# Patient Record
Sex: FEMALE | Race: WHITE | NOT HISPANIC OR LATINO | Employment: FULL TIME | URBAN - METROPOLITAN AREA
[De-identification: names, ages, dates, MRNs, and addresses within clinical notes are randomized per-mention and may not be internally consistent; named-entity substitution may affect disease eponyms.]

---

## 2018-08-17 ENCOUNTER — HOSPITAL ENCOUNTER (EMERGENCY)
Facility: HOSPITAL | Age: 33
Discharge: HOME | End: 2018-08-17
Attending: SURGERY
Payer: MEDICAID

## 2018-08-17 VITALS
HEART RATE: 68 BPM | BODY MASS INDEX: 41.77 KG/M2 | DIASTOLIC BLOOD PRESSURE: 69 MMHG | TEMPERATURE: 98 F | OXYGEN SATURATION: 97 % | RESPIRATION RATE: 16 BRPM | SYSTOLIC BLOOD PRESSURE: 120 MMHG | WEIGHT: 251 LBS

## 2018-08-17 DIAGNOSIS — M54.50 CHRONIC BILATERAL LOW BACK PAIN WITHOUT SCIATICA: Primary | ICD-10-CM

## 2018-08-17 DIAGNOSIS — G89.29 CHRONIC BILATERAL LOW BACK PAIN WITHOUT SCIATICA: Primary | ICD-10-CM

## 2018-08-17 PROCEDURE — 96372 THER/PROPH/DIAG INJ SC/IM: CPT

## 2018-08-17 PROCEDURE — 63600175 PHARM REV CODE 636 W HCPCS: Performed by: SURGERY

## 2018-08-17 PROCEDURE — 99283 EMERGENCY DEPT VISIT LOW MDM: CPT | Mod: 25

## 2018-08-17 RX ORDER — ONDANSETRON 2 MG/ML
4 INJECTION INTRAMUSCULAR; INTRAVENOUS
Status: COMPLETED | OUTPATIENT
Start: 2018-08-17 | End: 2018-08-17

## 2018-08-17 RX ORDER — KETOROLAC TROMETHAMINE 10 MG/1
10 TABLET, FILM COATED ORAL EVERY 6 HOURS PRN
Qty: 15 TABLET | Refills: 0 | Status: SHIPPED | OUTPATIENT
Start: 2018-08-17 | End: 2020-04-12

## 2018-08-17 RX ORDER — MEPERIDINE HYDROCHLORIDE 25 MG/ML
25 INJECTION INTRAMUSCULAR; INTRAVENOUS; SUBCUTANEOUS
Status: COMPLETED | OUTPATIENT
Start: 2018-08-17 | End: 2018-08-17

## 2018-08-17 RX ORDER — METFORMIN HYDROCHLORIDE 500 MG/1
500 TABLET ORAL 2 TIMES DAILY WITH MEALS
COMMUNITY
End: 2024-01-25

## 2018-08-17 RX ORDER — CYCLOBENZAPRINE HCL 10 MG
10 TABLET ORAL 3 TIMES DAILY PRN
Qty: 10 TABLET | Refills: 0 | Status: SHIPPED | OUTPATIENT
Start: 2018-08-17 | End: 2018-08-22

## 2018-08-17 RX ADMIN — ONDANSETRON 4 MG: 2 INJECTION INTRAMUSCULAR; INTRAVENOUS at 10:08

## 2018-08-17 RX ADMIN — MEPERIDINE HYDROCHLORIDE 25 MG: 25 INJECTION INTRAMUSCULAR; INTRAVENOUS; SUBCUTANEOUS at 10:08

## 2018-08-18 NOTE — ED PROVIDER NOTES
[facility]                                                 Chief Complaint  32 y.o. female with Back Pain    History of Present Illness  Rachael Guerra presents to the emergency room with low back pain this week  Patient works at the local [employer], heavy lifting every day at work  Patient with left lower paraspinous pain on interview, denies any acute trauma   Patient on exam has a normal lumbar spine without step-off or deformity noted  Patient has no signs of cauda equina syndrome with a normal neuro exam now  Back pain in the past, any movement exacerbates this recent lower lumbar pain    The history is provided by the patient   device was not used during this ER visit  Medical history: Fever blister, PCOS, psoriasis  Surgeries: , elbow surgery, ovarian cyst, exploratory laparotomy, tubal ligation  Allergies: Percocet    Review of Systems and Physical Exam      Review of Systems  -- Constitution - no fever, denies fatigue, no weakness, no chills  -- Eyes - no tearing or redness, no visual disturbance  -- Ear, Nose - no tinnitus or earache, no nasal congestion or discharge  -- Mouth,Throat - no sore throat, no toothache, normal voice, normal swallowing  -- Respiratory - denies cough and congestion, no shortness of breath, no ALCAZAR  -- Cardiovascular - denies chest pain, no palpitations, denies claudication  -- Gastrointestinal - denies abdominal pain, nausea, vomiting, or diarrhea  -- Genitourinary - no dysuria, denies flank pain, no hematuria, no STD risk  -- Musculoskeletal - back pain, negative for myalgias and arthralgias   -- Neurological - no headache, denies weakness or seizure; no LOC  -- Skin - denies pallor, rash, or changes in skin. no hives or welts noted  -- Psychiatric - Denies SI or HI, no psychosis or fractured thought noted     /71   Pulse 97   Temp 97.7 °F (36.5 °C) (Oral)   Resp 16     Physical Exam  -- Nursing note and  vitals reviewed  -- Constitutional: Appears well-developed and well-nourished  -- Head: Atraumatic. Normocephalic. No obvious abnormality  -- Eyes: Pupils are equal and reactive to light. Normal conjunctiva and lids  -- Cardiac: Normal rate, regular rhythm and normal heart sounds  -- Pulmonary: Normal respiratory effort, breath sounds clear to auscultation  -- Abdominal: Soft, no tenderness. Normal bowel sounds. Normal liver edge  -- Musculoskeletal: Normal range of motion, no effusions. Joints stable   -- Neurological: No focal deficits. Showed good interaction with staff  -- Vascular: Posterior tibial, dorsalis pedis and radial pulses 2+ bilaterally      Emergency Room Course      Treatment and Evaluation  -- L-spine x-rays showed no evidence of acute fracture or dislocation   -- IM 25 milligrams Demerol given in the ER  -- IM 4 mg Zofran given today in the ER     Diagnosis  -- The encounter diagnosis was Chronic bilateral low back pain without sciatica.    Disposition and Plan  -- Disposition: home  -- Condition: stable  -- Follow-up: Patient to follow up with Summer Jaime MD in 1-2 days.  -- I advised the patient that we have found no life threatening condition today  -- At this time, I believe the patient is clinically stable for discharge.   -- The patient acknowledges that close follow up with a MD is required   -- Patient agrees to comply with all instruction and direction given in the ER    This note is dictated on Dragon Natural Speaking word recognition program.  There are word recognition mistakes that are occasionally missed on review.         Jas Bahena MD  08/17/18 [id number]

## 2018-09-06 ENCOUNTER — TELEPHONE (OUTPATIENT)
Dept: PHARMACY | Facility: CLINIC | Age: 33
End: 2018-09-06

## 2018-09-06 NOTE — TELEPHONE ENCOUNTER
Initial Humira 40mg/0.8ml Pen Injection consult completed on . Humira 40mg/0.8ml Pen Injection will be shipped on 9/10 to arrive at patient's home on  via [employer]. $3 copay. Patient will start Humira 40mg/0.8ml Pen Injection on . Address confirmed, CC on file. Confirmed 2 patient identifiers - name and . Therapy Appropriate.    Counseled patient on administration directions:  -Inject Humira 40mg (1pen) into the skin every 14 days.- Take out of the refrigerator 30-60 minutes prior to injection.  - Wash hands before and after injection.  - Monthly RX will come with gauze, bandaids, and alcohol swabs.  - Patient may inject in either the tops of the thighs, abdomen- but at least 2 inches away from her belly button, or the outer part of her upper arm.  Patient was instructed to rotate injections sites.  - Patient is to wipe down the injection site with the alcohol pad, wait to dry.  Gently squeeze the area of the cleaned skin and hold it firmly.   Place the pen flat against the raised area of skin that is being squeezed, then push down on the button and hold for 10-15 seconds, until the window has gone from clear to yellow.  - Patient should rotate injection sites.   - Patient will use sharps container; once full, per JOSE ELIAS odonnell, she may lock the sharps container and place in her trash. She can then contact the Pharmacy and we will replace the sharps at no additional charge.    Patient was counseled on possible side effects:  - Injection site reaction: redness, soreness, itching, bruising, which should resolve within 3-5 days.   - Increased risk for infections.  If patient becomes sick, patient is to hold Humira  use until she/ he is better.     DDIs:  Medication list reviewed, no DDI    Patient verbalized understanding. Compliance stressed. Patient advised to keep a calendar marking dates of injections to ensure better compliance. Patient advised to call myself or provider should any questions arise. Patient  plans to start Humira on 9/14. Consultation included: indication; goals of treatment; administration; storage and handling; side effects; how to handle side effects; the importance of compliance; how to handle missed doses; the importance of laboratory monitoring; the importance of keeping all follow up appointments.  Patient understands to report any medication changes to [facility] and provider. All questions answered and addressed to patients satisfaction. I will f/u with her in 1 week from start, [facility] to contact patient in 3 weeks for refills.

## 2018-10-01 ENCOUNTER — TELEPHONE (OUTPATIENT)
Dept: PHARMACY | Facility: CLINIC | Age: 33
End: 2018-10-01

## 2018-10-25 ENCOUNTER — TELEPHONE (OUTPATIENT)
Dept: PHARMACY | Facility: CLINIC | Age: 33
End: 2018-10-25

## 2018-11-06 ENCOUNTER — OFFICE VISIT (OUTPATIENT)
Dept: URGENT CARE | Facility: CLINIC | Age: 33
End: 2018-11-06
Payer: MEDICAID

## 2018-11-06 VITALS
HEIGHT: 65 IN | OXYGEN SATURATION: 97 % | WEIGHT: 246 LBS | TEMPERATURE: 98 F | BODY MASS INDEX: 40.98 KG/M2 | RESPIRATION RATE: 18 BRPM | HEART RATE: 107 BPM | DIASTOLIC BLOOD PRESSURE: 90 MMHG | SYSTOLIC BLOOD PRESSURE: 138 MMHG

## 2018-11-06 DIAGNOSIS — H66.001 ACUTE SUPPURATIVE OTITIS MEDIA OF RIGHT EAR WITHOUT SPONTANEOUS RUPTURE OF TYMPANIC MEMBRANE, RECURRENCE NOT SPECIFIED: Primary | ICD-10-CM

## 2018-11-06 DIAGNOSIS — J32.4 PANSINUSITIS, UNSPECIFIED CHRONICITY: ICD-10-CM

## 2018-11-06 PROCEDURE — 99204 OFFICE O/P NEW MOD 45 MIN: CPT | Mod: S$GLB,,, | Performed by: PHYSICIAN ASSISTANT

## 2018-11-06 RX ORDER — DEXAMETHASONE SODIUM PHOSPHATE 100 MG/10ML
5 INJECTION INTRAMUSCULAR; INTRAVENOUS
Status: COMPLETED | OUTPATIENT
Start: 2018-11-06 | End: 2018-11-06

## 2018-11-06 RX ORDER — AMOXICILLIN AND CLAVULANATE POTASSIUM 875; 125 MG/1; MG/1
1 TABLET, FILM COATED ORAL 2 TIMES DAILY
Qty: 14 TABLET | Refills: 0 | Status: SHIPPED | OUTPATIENT
Start: 2018-11-06 | End: 2018-11-13

## 2018-11-06 RX ORDER — BROMPHENIRAMINE MALEATE, PSEUDOEPHEDRINE HYDROCHLORIDE, AND DEXTROMETHORPHAN HYDROBROMIDE 2; 30; 10 MG/5ML; MG/5ML; MG/5ML
10 SYRUP ORAL EVERY 6 HOURS PRN
Qty: 120 ML | Refills: 0 | Status: SHIPPED | OUTPATIENT
Start: 2018-11-06 | End: 2018-11-09

## 2018-11-06 RX ORDER — PREDNISONE 10 MG/1
10 TABLET ORAL DAILY
Qty: 4 TABLET | Refills: 0 | Status: SHIPPED | OUTPATIENT
Start: 2018-11-06 | End: 2018-11-10

## 2018-11-06 RX ADMIN — DEXAMETHASONE SODIUM PHOSPHATE 5 MG: 100 INJECTION INTRAMUSCULAR; INTRAVENOUS at 02:11

## 2018-11-06 NOTE — PROGRESS NOTES
"Subjective:       Patient ID: Rachael Guerra is a 33 y.o. female.    Vitals:  height is 5' 5" (1.651 m) and weight is 111.6 kg (246 lb). Her oral temperature is 98.3 °F (36.8 °C). Her blood pressure is 138/90 (abnormal) and her pulse is 107. Her respiration is 18 and oxygen saturation is 97%.     Chief Complaint: Sinus Problem    Patient complains of a RN, congestion, PND, sore throat, cough, headache, and sinus pressure/pain for the past 3 days.  Patient denies fever and chills.  Patient also denies improvement with OTC meds.  Patient denies any other complaints at this time.         Sinus Problem   This is a new problem. The current episode started in the past 7 days. The problem has been gradually worsening since onset. There has been no fever. The pain is moderate. Associated symptoms include congestion, coughing, ear pain, headaches, sinus pressure and a sore throat. Pertinent negatives include no chills, hoarse voice or shortness of breath. Past treatments include oral decongestants. The treatment provided no relief.     Review of Systems   Constitution: Negative for chills, fever and malaise/fatigue.   HENT: Positive for congestion, ear pain, sinus pressure and sore throat. Negative for hoarse voice.    Eyes: Negative for discharge and redness.   Cardiovascular: Negative for chest pain, dyspnea on exertion and leg swelling.   Respiratory: Positive for cough. Negative for shortness of breath, sputum production and wheezing.    Musculoskeletal: Negative for myalgias.   Gastrointestinal: Negative for abdominal pain, diarrhea, nausea and vomiting.   Neurological: Positive for headaches.   All other systems reviewed and are negative.      Objective:      Physical Exam   Constitutional: She is oriented to person, place, and time. She appears well-developed and well-nourished. No distress.   HENT:   Head: Normocephalic and atraumatic.   Right Ear: External ear and ear canal normal. Tympanic membrane is " erythematous. A middle ear effusion (purulent) is present.   Left Ear: External ear and ear canal normal. Tympanic membrane is erythematous (mild). A middle ear effusion is present.   Nose: Mucosal edema and rhinorrhea present. Right sinus exhibits maxillary sinus tenderness and frontal sinus tenderness. Left sinus exhibits maxillary sinus tenderness and frontal sinus tenderness.   Mouth/Throat: Uvula is midline and mucous membranes are normal. Posterior oropharyngeal erythema present. No tonsillar exudate.   Eyes: Conjunctivae, EOM and lids are normal. Pupils are equal, round, and reactive to light.   Neck: Normal range of motion. Neck supple.   Cardiovascular: Normal rate, regular rhythm and normal heart sounds.   Pulmonary/Chest: Effort normal and breath sounds normal. No respiratory distress.   Abdominal: Soft. Normal appearance and bowel sounds are normal. She exhibits no distension and no mass. There is no tenderness.   Musculoskeletal: Normal range of motion.   Neurological: She is alert and oriented to person, place, and time. She has normal strength. No cranial nerve deficit or sensory deficit.   Skin: Skin is warm. Capillary refill takes less than 2 seconds.   Psychiatric: She has a normal mood and affect. Her speech is normal and behavior is normal. Judgment and thought content normal. Cognition and memory are normal.   Nursing note and vitals reviewed.      Assessment:       1. Acute suppurative otitis media of right ear without spontaneous rupture of tympanic membrane, recurrence not specified    2. Pansinusitis, unspecified chronicity        Plan:         Acute suppurative otitis media of right ear without spontaneous rupture of tympanic membrane, recurrence not specified  -     dexamethasone injection 5 mg  -     predniSONE (DELTASONE) 10 MG tablet; Take 1 tablet (10 mg total) by mouth once daily. Start tomorrow. for 4 days  Dispense: 4 tablet; Refill: 0  -     amoxicillin-clavulanate 875-125mg  (AUGMENTIN) 875-125 mg per tablet; Take 1 tablet by mouth 2 (two) times daily. for 7 days  Dispense: 14 tablet; Refill: 0  -     brompheniramine-pseudoeph-DM (BROMFED DM) 2-30-10 mg/5 mL Syrp; Take 10 mLs by mouth every 6 (six) hours as needed.  Dispense: 120 mL; Refill: 0    Pansinusitis, unspecified chronicity  -     dexamethasone injection 5 mg  -     predniSONE (DELTASONE) 10 MG tablet; Take 1 tablet (10 mg total) by mouth once daily. Start tomorrow. for 4 days  Dispense: 4 tablet; Refill: 0  -     amoxicillin-clavulanate 875-125mg (AUGMENTIN) 875-125 mg per tablet; Take 1 tablet by mouth 2 (two) times daily. for 7 days  Dispense: 14 tablet; Refill: 0  -     brompheniramine-pseudoeph-DM (BROMFED DM) 2-30-10 mg/5 mL Syrp; Take 10 mLs by mouth every 6 (six) hours as needed.  Dispense: 120 mL; Refill: 0      Patient Instructions   1.  Take all antibiotics as prescribed.  It is imperative that once you start them, you take them to completion.     2.  For patients above 6 months of age who are not allergic to and are not on anticoagulants, you can alternate Tylenol and Motrin every 4-6 hours for fever above 100.4F and/or pain.  For patients less than 6 months of age, allergic to or intolerant to NSAIDS, have gastritis, gastric ulcers, or history of GI bleeds, are pregnant, or are on anticoagulant therapy, you can take Tylenol every 4 hours as needed for fever above 100.4F and/or pain.     3.  Rest and keep yourself well hydrated.  Drink hot liquids (coffee, water, tea, hot chocolate, or soup) 10-12 times a day for 5-7 days.  Put liquid in a mug and place in microwave for 2.5 - 3 minutes. Pour hot liquid into another mug not used to microwave the liquid (to avoid burning your mouth) then sniff the steam from the cup and sip the heated liquid.    4.  You can use these over the counter medications/remedies to help with your symptoms:     Runny Nose:  Use an antihistamine such as Claritin, Zyrtec or Allegra to help dry  you out.     Congestion:  Use pseudoephedrine (behind the counter) for congestion- Pseudoephedrine 30 mg up to 240 mg /day. Warning:  It can raise your blood pressure and give you palpitations.  Coricidin HBP is okay to use if you have high blood pressure.     Use mucinex (guaifenisin) up to 2400mg/day to break up/loosen any mucous. MucinexDM has a cough suppressant that can be used for cough and at night to stop the tickle in the back of your throat.    Use Nasal Saline to mechanically move any post nasal drip from your eustachian tubes or from the back of your throat.    Use Afrin in each nare, for no longer than 3 days, as it is addictive. It can also dry out your mucous membranes and cause elevated blood pressure.    Use Flonase 1-2 sprays/nostril per day. It is a local acting steroid nasal spray.  If you develop a bloody nose, stop using the medication immediately.    Sore throat:  Use warm, salt water gargles to ease your throat pain- 1/2 tsp salt to 1 cup warm water, gargle as desired.  Chloraseptic sprays and throat lozenges will also help to ease throat pain.     Sometimes Nyquil at night is beneficial to help you get some rest; however, it is sedating and does contain an antihistamine and Tylenol.  Make sure not to double up on these medications.      These things will help you to feel better and will speed your recovery.  If your condition fails to improve in a timely manner, you should receive another evaluation by your Primary Care Provider/Pediatrician to discuss your concerns or return to urgent care for a recheck.  If your condition worsens at any time, you should report immediately to your nearest Emergency Department for further evaluation. **You must understand that you have received Urgent Care treatment only and that you may be released before all of your medical problems are known or treated. You, the patient, are responsible to arrange for follow-up care as instructed.         Sinusitis  (Antibiotic Treatment)    The sinuses are air-filled spaces within the bones of the face. They connect to the inside of the nose. Sinusitis is an inflammation of the tissue lining the sinus cavity. Sinus inflammation can occur during a cold. It can also be due to allergies to pollens and other particles in the air. Sinusitis can cause symptoms of sinus congestion and fullness. A sinus infection causes fever, headache and facial pain. There is often green or yellow drainage from the nose or into the back of the throat (post-nasal drip). You have been given antibiotics to treat this condition.  Home care:  · Take the full course of antibiotics as instructed. Do not stop taking them, even if you feel better.  · Drink plenty of water, hot tea, and other liquids. This may help thin mucus. It also may promote sinus drainage.  · Heat may help soothe painful areas of the face. Use a towel soaked in hot water. Or,  the shower and direct the hot spray onto your face. Using a vaporizer along with a menthol rub at night may also help.   · An expectorant containing guaifenesin may help thin the mucus and promote drainage from the sinuses.  · Over-the-counter decongestants may be used unless a similar medicine was prescribed. Nasal sprays work the fastest. Use one that contains phenylephrine or oxymetazoline. First blow the nose gently. Then use the spray. Do not use these medicines more often than directed on the label or symptoms may get worse. You may also use tablets containing pseudoephedrine. Avoid products that combine ingredients, because side effects may be increased. Read labels. You can also ask the pharmacist for help. (NOTE: Persons with high blood pressure should not use decongestants. They can raise blood pressure.)  · Over-the-counter antihistamines may help if allergies contributed to your sinusitis.    · Do not use nasal rinses or irrigation during an acute sinus infection, unless told to by your health  care provider. Rinsing may spread the infection to other sinuses.  · Use acetaminophen or ibuprofen to control pain, unless another pain medicine was prescribed. (If you have chronic liver or kidney disease or ever had a stomach ulcer, talk with your doctor before using these medicines. Aspirin should never be used in anyone under 18 years of age who is ill with a fever. It may cause severe liver damage.)  · Don't smoke. This can worsen symptoms.  Follow-up care  Follow up with your healthcare provider or our staff if you are not improving within the next week.  When to seek medical advice  Call your healthcare provider if any of these occur:  · Facial pain or headache becoming more severe  · Stiff neck  · Unusual drowsiness or confusion  · Swelling of the forehead or eyelids  · Vision problems, including blurred or double vision  · Fever of 100.4ºF (38ºC) or higher, or as directed by your healthcare provider  · Seizure  · Breathing problems  · Symptoms not resolving within 10 days  Date Last Reviewed: 4/13/2015  © 0999-4680 [employer]. [address] [place] [address], [place] [address]. All rights reserved. This information is not intended as a substitute for professional medical care. Always follow your healthcare professional's instructions.

## 2018-11-06 NOTE — PATIENT INSTRUCTIONS
1.  Take all antibiotics as prescribed.  It is imperative that once you start them, you take them to completion.     2.  For patients above 6 months of age who are not allergic to and are not on anticoagulants, you can alternate Tylenol and Motrin every 4-6 hours for fever above 100.4F and/or pain.  For patients less than 6 months of age, allergic to or intolerant to NSAIDS, have gastritis, gastric ulcers, or history of GI bleeds, are pregnant, or are on anticoagulant therapy, you can take Tylenol every 4 hours as needed for fever above 100.4F and/or pain.     3.  Rest and keep yourself well hydrated.  Drink hot liquids (coffee, water, tea, hot chocolate, or soup) 10-12 times a day for 5-7 days.  Put liquid in a mug and place in microwave for 2.5 - 3 minutes. Pour hot liquid into another mug not used to microwave the liquid (to avoid burning your mouth) then sniff the steam from the cup and sip the heated liquid.    4.  You can use these over the counter medications/remedies to help with your symptoms:     Runny Nose:  Use an antihistamine such as Claritin, Zyrtec or Allegra to help dry you out.     Congestion:  Use pseudoephedrine (behind the counter) for congestion- Pseudoephedrine 30 mg up to 240 mg /day. Warning:  It can raise your blood pressure and give you palpitations.  Coricidin HBP is okay to use if you have high blood pressure.     Use mucinex (guaifenisin) up to 2400mg/day to break up/loosen any mucous. MucinexDM has a cough suppressant that can be used for cough and at night to stop the tickle in the back of your throat.    Use Nasal Saline to mechanically move any post nasal drip from your eustachian tubes or from the back of your throat.    Use Afrin in each nare, for no longer than 3 days, as it is addictive. It can also dry out your mucous membranes and cause elevated blood pressure.    Use Flonase 1-2 sprays/nostril per day. It is a local acting steroid nasal spray.  If you develop a bloody nose,  stop using the medication immediately.    Sore throat:  Use warm, salt water gargles to ease your throat pain- 1/2 tsp salt to 1 cup warm water, gargle as desired.  Chloraseptic sprays and throat lozenges will also help to ease throat pain.     Sometimes Nyquil at night is beneficial to help you get some rest; however, it is sedating and does contain an antihistamine and Tylenol.  Make sure not to double up on these medications.      These things will help you to feel better and will speed your recovery.  If your condition fails to improve in a timely manner, you should receive another evaluation by your Primary Care Provider/Pediatrician to discuss your concerns or return to urgent care for a recheck.  If your condition worsens at any time, you should report immediately to your nearest Emergency Department for further evaluation. **You must understand that you have received Urgent Care treatment only and that you may be released before all of your medical problems are known or treated. You, the patient, are responsible to arrange for follow-up care as instructed.         Sinusitis (Antibiotic Treatment)    The sinuses are air-filled spaces within the bones of the face. They connect to the inside of the nose. Sinusitis is an inflammation of the tissue lining the sinus cavity. Sinus inflammation can occur during a cold. It can also be due to allergies to pollens and other particles in the air. Sinusitis can cause symptoms of sinus congestion and fullness. A sinus infection causes fever, headache and facial pain. There is often green or yellow drainage from the nose or into the back of the throat (post-nasal drip). You have been given antibiotics to treat this condition.  Home care:  · Take the full course of antibiotics as instructed. Do not stop taking them, even if you feel better.  · Drink plenty of water, hot tea, and other liquids. This may help thin mucus. It also may promote sinus drainage.  · Heat may help  soothe painful areas of the face. Use a towel soaked in hot water. Or,  the shower and direct the hot spray onto your face. Using a vaporizer along with a menthol rub at night may also help.   · An expectorant containing guaifenesin may help thin the mucus and promote drainage from the sinuses.  · Over-the-counter decongestants may be used unless a similar medicine was prescribed. Nasal sprays work the fastest. Use one that contains phenylephrine or oxymetazoline. First blow the nose gently. Then use the spray. Do not use these medicines more often than directed on the label or symptoms may get worse. You may also use tablets containing pseudoephedrine. Avoid products that combine ingredients, because side effects may be increased. Read labels. You can also ask the pharmacist for help. (NOTE: Persons with high blood pressure should not use decongestants. They can raise blood pressure.)  · Over-the-counter antihistamines may help if allergies contributed to your sinusitis.    · Do not use nasal rinses or irrigation during an acute sinus infection, unless told to by your health care provider. Rinsing may spread the infection to other sinuses.  · Use acetaminophen or ibuprofen to control pain, unless another pain medicine was prescribed. (If you have chronic liver or kidney disease or ever had a stomach ulcer, talk with your doctor before using these medicines. Aspirin should never be used in anyone under 18 years of age who is ill with a fever. It may cause severe liver damage.)  · Don't smoke. This can worsen symptoms.  Follow-up care  Follow up with your healthcare provider or our staff if you are not improving within the next week.  When to seek medical advice  Call your healthcare provider if any of these occur:  · Facial pain or headache becoming more severe  · Stiff neck  · Unusual drowsiness or confusion  · Swelling of the forehead or eyelids  · Vision problems, including blurred or double  vision  · Fever of 100.4ºF (38ºC) or higher, or as directed by your healthcare provider  · Seizure  · Breathing problems  · Symptoms not resolving within 10 days  Date Last Reviewed: 4/13/2015  © 6743-7483 [employer]. [address] [place] [address], [place] [address]. All rights reserved. This information is not intended as a substitute for professional medical care. Always follow your healthcare professional's instructions.

## 2018-11-21 ENCOUNTER — TELEPHONE (OUTPATIENT)
Dept: PHARMACY | Facility: CLINIC | Age: 33
End: 2018-11-21

## 2018-11-21 NOTE — TELEPHONE ENCOUNTER
Reach out to [facility] refill for Humira- patient informed the prescription for Humira is D/C & provider will be sending over a new script for Cosentyx- provider provided sample to the patient- inform patient will reach out to clinical pharmacist to follow up with provider.

## 2018-11-27 NOTE — TELEPHONE ENCOUNTER
Dr. Alejo informed us she did not switch patient from Humira to Cosentyx. Asked that we reach out to patient to see if it was her dermatologist (Dr. Webber) who changed her treatment plan. LVM for callback.

## 2018-11-28 PROBLEM — R19.00 PELVIC MASS: Status: ACTIVE | Noted: 2018-11-28

## 2018-11-28 PROBLEM — Z90.721 HISTORY OF LEFT SALPINGO-OOPHORECTOMY: Status: ACTIVE | Noted: 2018-11-28

## 2018-11-28 PROBLEM — Z90.79 HISTORY OF LEFT SALPINGO-OOPHORECTOMY: Status: ACTIVE | Noted: 2018-11-28

## 2018-11-28 PROBLEM — N83.519 OVARIAN TORSION: Status: ACTIVE | Noted: 2018-11-28

## 2018-11-28 PROBLEM — N83.209 OVARIAN CYST: Status: ACTIVE | Noted: 2018-11-28

## 2018-11-30 ENCOUNTER — TELEPHONE (OUTPATIENT)
Dept: PHARMACY | Facility: CLINIC | Age: 33
End: 2018-11-30

## 2018-12-03 NOTE — TELEPHONE ENCOUNTER
Cosentyx initial. $3 (004) - need updated CC on file. Unable to LVM for callback.   **NOTE: Patient was given samples in office. Confirm where she is in loading weeks.

## 2018-12-03 NOTE — TELEPHONE ENCOUNTER
DOCUMENTATION ONLY:  Prior authorization for Cosentyx approved from 12/3/18 to 12/3/19    Case Id: [id number]    Co-pay: $3.00    Patient Assistance is not required.

## 2018-12-11 PROBLEM — T81.31XA WOUND DISRUPTION, POST-OP, SKIN: Status: ACTIVE | Noted: 2018-12-11

## 2019-01-02 PROBLEM — R19.00 PELVIC MASS: Status: RESOLVED | Noted: 2018-11-28 | Resolved: 2019-01-02

## 2019-01-02 PROBLEM — N83.209 OVARIAN CYST: Status: RESOLVED | Noted: 2018-11-28 | Resolved: 2019-01-02

## 2019-01-02 PROBLEM — N83.519 OVARIAN TORSION: Status: RESOLVED | Noted: 2018-11-28 | Resolved: 2019-01-02

## 2019-01-03 ENCOUNTER — TELEPHONE (OUTPATIENT)
Dept: PHARMACY | Facility: CLINIC | Age: 34
End: 2019-01-03

## 2019-01-03 NOTE — TELEPHONE ENCOUNTER
Initial Cosentyx SensoReady Pens 150mg consult completed on 1/3/19. Cosentyx SensoReady Pens 150mg will be shipped on  to arrive at patient's home on  via [employer]. $ 3 copay. Patient will start Cosentyx SensoReady Pens 150mg on . Address confirmed, CC on file. Confirmed 2 patient identifiers - name and . Therapy Appropriate.    - Cosentyx SensoReady Pens 150mg:  Inject 2 pens (300mg) every week x 5 weeks, then 2 pens (300mg) every 4 weeks.    -Storage: Refrigerate between 36-46 degrees Fahrenheit  Use within ONE HOUR of taking out of fridge.    -Injection technique:  - Wash hands before and after injection.  - Monthly RX will come with gauze, bandaids, and alcohol swabs.  - Patient may inject in either the tops of the thighs, abdomen- but at least 2 inches away from her belly button, or the outer part of her upper arm. Patient was instructed to rotate injections sites.  - Examine device to ensure no particulates, cloudiness, etc.  - Patient is to wipe down the injection site with the alcohol pad, wait to dry.  Gently squeeze the area of the cleaned skin and hold it firmly. Place the pen flat at a 90 degree angle against the raised area of skin that is being squeezed, and then push down firmly on the pen to start the injection. The 1st 'click' indicates the start and the second 'click' indicates that the injection is almost complete. A green indicator will fill the window when completed, and pen can be removed.  - Patient will use sharps container; once full, per [place] law, she may lock the sharps container and place in her trash. She can then contact the Pharmacy and we will replace the sharps at no additional charge.    -Potential Side effects:  Injection site reactions, diarrhea, cold like symptoms.  Patient advised to call if any signs of allergic reaction, infection, or use of live vaccines.    -DDI: Medication list reviewed and potential DDIs addressed.  Patient verbalized understanding. Compliance  stressed. Patient advised to keep a calendar marking dates of injections to ensure better compliance. Patient advised to call myself or provider should any questions arise. Patient plans to start Cosentyx SensoReady Pens on 1/8/19. Consultation included: indication; goals of treatment; administration; storage and handling; side effects; how to handle side effects; the importance of compliance; how to handle missed doses; the importance of laboratory monitoring; the importance of keeping all follow up appointments. Patient understands to report any medication changes to [facility] and provider. All questions answered and addressed to patients satisfaction. I will f/u with patient in 1 week from start, [facility] to contact patient in 3 weeks for refills.    Sabi Andersen, PharmD  [facility]  [phone]

## 2019-01-29 ENCOUNTER — TELEPHONE (OUTPATIENT)
Dept: PHARMACY | Facility: CLINIC | Age: 34
End: 2019-01-29

## 2019-02-04 NOTE — TELEPHONE ENCOUNTER
Patient called back to arrange Cosentyx refill. $3 (004) - She will be calling us back with updated CC info. Address confirmed. Shipping out 2/6/19. Week 5 of loading will be 2/18/19.

## 2019-03-11 ENCOUNTER — TELEPHONE (OUTPATIENT)
Dept: PHARMACY | Facility: CLINIC | Age: 34
End: 2019-03-11

## 2019-03-28 ENCOUNTER — TELEPHONE (OUTPATIENT)
Dept: PHARMACY | Facility: CLINIC | Age: 34
End: 2019-03-28

## 2019-03-28 NOTE — TELEPHONE ENCOUNTER
FOR DOCUMENTATION ONLY:  Financial Assistance for Cosentyx approved from 3/28/19 to 3/28/20  Source: [facility] Patient Assistance [employer]  Phone: [phone]  Fax: [phone]

## 2019-03-30 PROBLEM — S60.022A CONTUSION OF LEFT INDEX FINGER WITHOUT DAMAGE TO NAIL: Status: ACTIVE | Noted: 2019-03-30

## 2020-04-12 PROBLEM — N83.201 RIGHT OVARIAN CYST: Status: ACTIVE | Noted: 2020-04-12

## 2020-06-27 ENCOUNTER — OFFICE VISIT (OUTPATIENT)
Dept: URGENT CARE | Facility: CLINIC | Age: 35
End: 2020-06-27
Payer: COMMERCIAL

## 2020-06-27 VITALS
WEIGHT: 252 LBS | TEMPERATURE: 98 F | OXYGEN SATURATION: 98 % | SYSTOLIC BLOOD PRESSURE: 131 MMHG | BODY MASS INDEX: 41.99 KG/M2 | HEIGHT: 65 IN | DIASTOLIC BLOOD PRESSURE: 97 MMHG | HEART RATE: 88 BPM

## 2020-06-27 DIAGNOSIS — M77.8 TENDINITIS OF RIGHT HAND: Primary | ICD-10-CM

## 2020-06-27 DIAGNOSIS — M79.641 RIGHT HAND PAIN: ICD-10-CM

## 2020-06-27 DIAGNOSIS — R03.0 ELEVATED BLOOD PRESSURE READING: ICD-10-CM

## 2020-06-27 PROCEDURE — 73130 XR HAND COMPLETE 3 VIEW RIGHT: ICD-10-PCS | Mod: FY,RT,S$GLB, | Performed by: RADIOLOGY

## 2020-06-27 PROCEDURE — 99214 PR OFFICE/OUTPT VISIT, EST, LEVL IV, 30-39 MIN: ICD-10-PCS | Mod: S$GLB,,, | Performed by: NURSE PRACTITIONER

## 2020-06-27 PROCEDURE — 73130 X-RAY EXAM OF HAND: CPT | Mod: FY,RT,S$GLB, | Performed by: RADIOLOGY

## 2020-06-27 PROCEDURE — 99214 OFFICE O/P EST MOD 30 MIN: CPT | Mod: S$GLB,,, | Performed by: NURSE PRACTITIONER

## 2020-06-27 RX ORDER — PREDNISONE 5 MG/1
TABLET ORAL
Qty: 6 TABLET | Refills: 0 | Status: SHIPPED | OUTPATIENT
Start: 2020-06-27 | End: 2021-05-04

## 2020-06-27 NOTE — PATIENT INSTRUCTIONS
Elevated Blood Pressure  Your blood pressure was elevated during your visit to the urgent care today.  It was not so high that immediate care was needed, but it is recommended that you monitor your blood pressure over the next week or two to make sure that it is not staying elevated.  If you are on blood pressure medication currently, continue as already prescribed. Please have your blood pressure taken 2-3 times daily at different times of the day.  Keep a log of these blood pressure readings and take it with you to see your Primary Care Physician.  Bring today's discharge papers as well to your follow up appointment. If your blood pressure is consistently above 140/90, you should follow-up with your PCP without delay. If you develop chest pain, shortness of breath, dizziness, vision changes, or any other concerning symptoms, you should seek immediate care in the Emergency Department.          Tendonitis  A tendon is the thick fibrous cord that joins muscle to bone and allows joints to move. When a tendon becomes inflamed, it is called tendonitis. This can occur from overuse, injury, or infection. This usually involves the shoulders, forearm, wrist, hands and foot. Symptoms include pain, swelling and tenderness to the touch. Moving the joint increases the pain.  It takes 4 to 6 weeks for tendonitis to heal. It is treated by preventing motion of the tendon with a splint or brace and the use of anti-inflammatory medicine.  Home care  Some people find relief with ice packs. These can be crushed or cubed ice in a plastic bag or a bag of frozen vegetables wrapped in a thin towel. Other people get better relief with heat. This can include a hot shower, hot bath, or a moist towel warmed in a microwave. Try each and use the method that feels best, for 15 to 20 minutes several times a day.  Rest the inflamed joint and protect it from movement.  You may use over-the-counter ibuprofen or naproxen to treat pain and  inflammation, unless another medicine was prescribed. If you can't take these medicines, acetaminophen may help with the pain, but does not treat inflammation. If you have chronic liver or kidney disease or ever had a stomach ulcer or gastrointestinal bleeding, talk with your doctor before using these medicines.  As your symptoms improve, begin gradual motion at the involved joint.  Follow-up care  Follow up with your healthcare provider if you are not improving after 5 days of treatment.  When to seek medical advice  Call your healthcare provider right away if any of these occur:  Redness over the painful area  Increasing pain or swelling at the joint  Fever (1 degree above your normal temperature) lasting 24 to 48 hours Or, whatever your healthcare provider told you to report based on your condition  Date Last Reviewed: 11/21/2015  © 8063-6352 [employer]. [address] [place] [address], [place] [address]. All rights reserved. This information is not intended as a substitute for professional medical care. Always follow your healthcare professional's instructions.      ·   ·   · Follow up with your primary care in 2-5 days if symptoms have not improved, or you may return here.  · If you were referred to a specialist, please follow up with that specialty.  · If you were prescribed antibiotics, please take them to completion.  · If you were prescribed a narcotic or any medication with sedative effects, do not drive or operate heavy equipment or machinery while taking these medications.  · You must understand that you have received treatment at an Urgent Care facility only, and that you may be released before all of your medical problems are known or treated. Urgent Care facilities are not equipped to handle life threatening emergencies. It is recommended that you go to an Emergency Department for further evaluation of worsening or concerning symptoms, or possibly life threatening conditions as discussed.                                         If you  smoke, please stop smoking

## 2020-06-27 NOTE — PROGRESS NOTES
"Subjective:       Patient ID: Rachael Guerra is a 34 y.o. female.    Vitals:  height is 5' 5" (1.651 m) and weight is 114.3 kg (252 lb). Her tympanic temperature is 97.8 °F (36.6 °C). Her blood pressure is 131/97 (abnormal) and her pulse is 88. Her oxygen saturation is 98%.     Chief Complaint: Hand Pain (right)    Pt reports psoriatic arthritis history, was uncertain if this was flaring causing pain. Not improving with compression and elevation.     Hand Pain   The incident occurred 5 to 7 days ago (3 days). Incident location: unknown. The injury mechanism is unknown. The pain is present in the right hand. The quality of the pain is described as aching. The pain does not radiate. The pain is at a severity of 3/10. The pain is mild. The pain has been intermittent since the incident. Pertinent negatives include no chest pain, muscle weakness, numbness or tingling. Associated symptoms comments: Swelling  Right hand. The symptoms are aggravated by movement. She has tried acetaminophen for the symptoms. The treatment provided mild relief.       Constitution: Negative for chills, fatigue and fever.   HENT: Negative for facial swelling and facial trauma.    Neck: Negative for neck pain, neck stiffness and painful lymph nodes.   Cardiovascular: Negative for chest trauma, chest pain and leg swelling.   Eyes: Negative for eye trauma, eye pain, double vision and blurred vision.   Respiratory: Negative for chest tightness, cough and shortness of breath.    Gastrointestinal: Negative for abdominal trauma, abdominal pain, nausea, vomiting and diarrhea.   Genitourinary: Negative for dysuria, frequency, urgency, history of kidney stones and pelvic pain.   Musculoskeletal: Positive for pain (right hand), joint pain and joint swelling. Negative for abnormal ROM of joint, muscle cramps and muscle ache.   Skin: Negative for color change, pale, rash and bruising.   Allergic/Immunologic: Negative for seasonal allergies. "   Neurological: Negative for dizziness, history of vertigo, light-headedness, passing out, headaches, numbness and tingling.   Hematologic/Lymphatic: Negative for swollen lymph nodes.   Psychiatric/Behavioral: Negative for nervous/anxious, sleep disturbance and depression. The patient is not nervous/anxious.        Objective:      Physical Exam   Constitutional: She is oriented to person, place, and time.  Non-toxic appearance. She does not appear ill. No distress.   HENT:   Head: Normocephalic and atraumatic.   Eyes: Conjunctivae are normal. Right eye exhibits no discharge. Left eye exhibits no discharge. No scleral icterus.   Neck: Normal range of motion.   Cardiovascular: Normal rate.   Pulmonary/Chest: Effort normal and breath sounds normal. No respiratory distress.   Abdominal: Normal appearance.   Musculoskeletal: Normal range of motion.         General: Swelling and tenderness present. No deformity.      Right elbow: Normal.She exhibits normal range of motion, no swelling, no effusion, no deformity and no laceration. No tenderness found. No radial head, no medial epicondyle, no lateral epicondyle and no olecranon process tenderness noted.      Right wrist: Normal. She exhibits normal range of motion, no tenderness, no bony tenderness, no swelling, no effusion, no crepitus, no deformity and no laceration.      Right hand: She exhibits tenderness and swelling. She exhibits normal range of motion, no bony tenderness, normal two-point discrimination, normal capillary refill, no deformity and no laceration. Normal sensation noted. Decreased sensation is not present in the ulnar distribution, is not present in the medial distribution and is not present in the radial distribution. Normal strength noted. She exhibits no finger abduction, no thumb/finger opposition and no wrist extension trouble.        Hands:       Right lower leg: No edema.      Left lower leg: No edema.   Neurological: She is alert and oriented to  person, place, and time. Gait normal.   Skin: Skin is warm, dry, not diaphoretic and rash (patient with x3 (2cm) dry skin patches bilat elbows and left forearm). Capillary refill takes less than 2 seconds.   Psychiatric: Her behavior is normal. Mood, judgment and thought content normal.   Nursing note and vitals reviewed.        Assessment:       1. Tendinitis of right hand    2. Right hand pain    3. Elevated blood pressure reading        Plan:       Patient is able to fully range the right hand fingers, hand and wrist.  Neurovascularly intact distally.  X-ray of right hand reviewed on PACS, no acute findings, rads report is consistent.  Reviewed x-ray findings with patient.  Patient likely with tendinitis given exam.  Will treat short-term with prednisone which hopefully will help her psoriatic rashes well.  She is to follow-up with the family provider for recheck of blood pressure as well.  Educated on signs symptoms return urgent care, seek emergency care.  Verbalized understanding agreement with treatment plan.    Tendinitis of right hand  -     predniSONE (DELTASONE) 5 MG tablet; Take 10mg by mouth days 1 and 2 then 5mg days 3 and 4  Dispense: 6 tablet; Refill: 0    Right hand pain  -     XR HAND COMPLETE 3 VIEW RIGHT; Future; Expected date: 06/27/2020  -     predniSONE (DELTASONE) 5 MG tablet; Take 10mg by mouth days 1 and 2 then 5mg days 3 and 4  Dispense: 6 tablet; Refill: 0    Elevated blood pressure reading        Xr Hand Complete 3 View Right    Result Date: 6/27/2020  EXAMINATION: XR HAND COMPLETE 3 VIEW RIGHT CLINICAL HISTORY: Pain in right hand TECHNIQUE: Three-view RIGHT hand COMPARISON: None FINDINGS: The alignment is within normal limits.  No displaced fractures identified.  No evidence of lytic or blastic lesions.Joint spaces are unremarkable.Soft tissues are unremarkable.     No evidence of fracture.No significant degenerative changes. Electronically signed by: Reginald Stinson MD  Date:    06/27/2020 Time:    11:05        Patient Instructions     Elevated Blood Pressure  Your blood pressure was elevated during your visit to the urgent care today.  It was not so high that immediate care was needed, but it is recommended that you monitor your blood pressure over the next week or two to make sure that it is not staying elevated.  If you are on blood pressure medication currently, continue as already prescribed. Please have your blood pressure taken 2-3 times daily at different times of the day.  Keep a log of these blood pressure readings and take it with you to see your Primary Care Physician.  Bring today's discharge papers as well to your follow up appointment. If your blood pressure is consistently above 140/90, you should follow-up with your PCP without delay. If you develop chest pain, shortness of breath, dizziness, vision changes, or any other concerning symptoms, you should seek immediate care in the Emergency Department.          Tendonitis  A tendon is the thick fibrous cord that joins muscle to bone and allows joints to move. When a tendon becomes inflamed, it is called tendonitis. This can occur from overuse, injury, or infection. This usually involves the shoulders, forearm, wrist, hands and foot. Symptoms include pain, swelling and tenderness to the touch. Moving the joint increases the pain.  It takes 4 to 6 weeks for tendonitis to heal. It is treated by preventing motion of the tendon with a splint or brace and the use of anti-inflammatory medicine.  Home care  Some people find relief with ice packs. These can be crushed or cubed ice in a plastic bag or a bag of frozen vegetables wrapped in a thin towel. Other people get better relief with heat. This can include a hot shower, hot bath, or a moist towel warmed in a microwave. Try each and use the method that feels best, for 15 to 20 minutes several times a day.  Rest the inflamed joint and protect it from movement.  You may use  over-the-counter ibuprofen or naproxen to treat pain and inflammation, unless another medicine was prescribed. If you can't take these medicines, acetaminophen may help with the pain, but does not treat inflammation. If you have chronic liver or kidney disease or ever had a stomach ulcer or gastrointestinal bleeding, talk with your doctor before using these medicines.  As your symptoms improve, begin gradual motion at the involved joint.  Follow-up care  Follow up with your healthcare provider if you are not improving after 5 days of treatment.  When to seek medical advice  Call your healthcare provider right away if any of these occur:  Redness over the painful area  Increasing pain or swelling at the joint  Fever (1 degree above your normal temperature) lasting 24 to 48 hours Or, whatever your healthcare provider told you to report based on your condition  Date Last Reviewed: 11/21/2015  © 5260-8699 [employer]. [address] [place] [address]. All rights reserved. This information is not intended as a substitute for professional medical care. Always follow your healthcare professional's instructions.      ·   ·   · Follow up with your primary care in 2-5 days if symptoms have not improved, or you may return here.  · If you were referred to a specialist, please follow up with that specialty.  · If you were prescribed antibiotics, please take them to completion.  · If you were prescribed a narcotic or any medication with sedative effects, do not drive or operate heavy equipment or machinery while taking these medications.  · You must understand that you have received treatment at an Urgent Care facility only, and that you may be released before all of your medical problems are known or treated. Urgent Care facilities are not equipped to handle life threatening emergencies. It is recommended that you go to an Emergency Department for further evaluation of worsening or concerning symptoms, or  possibly life threatening conditions as discussed.                                        If you  smoke, please stop smoking

## 2023-06-02 ENCOUNTER — HOSPITAL ENCOUNTER (EMERGENCY)
Facility: HOSPITAL | Age: 38
Discharge: HOME | End: 2023-06-02
Attending: SURGERY
Payer: MEDICAID

## 2023-06-02 VITALS
TEMPERATURE: 99 F | BODY MASS INDEX: 43.27 KG/M2 | SYSTOLIC BLOOD PRESSURE: 165 MMHG | RESPIRATION RATE: 16 BRPM | OXYGEN SATURATION: 100 % | HEART RATE: 115 BPM | DIASTOLIC BLOOD PRESSURE: 94 MMHG | WEIGHT: 260.06 LBS

## 2023-06-02 DIAGNOSIS — J02.0 STREP PHARYNGITIS: Primary | ICD-10-CM

## 2023-06-02 LAB
GROUP A STREP, MOLECULAR: POSITIVE
INFLUENZA A, MOLECULAR: NEGATIVE
INFLUENZA B, MOLECULAR: NEGATIVE
SARS-COV-2 RDRP RESP QL NAA+PROBE: NEGATIVE
SPECIMEN SOURCE: NORMAL

## 2023-06-02 PROCEDURE — 87651 STREP A DNA AMP PROBE: CPT | Performed by: NURSE PRACTITIONER

## 2023-06-02 PROCEDURE — 25000003 PHARM REV CODE 250: Performed by: SURGERY

## 2023-06-02 PROCEDURE — 87502 INFLUENZA DNA AMP PROBE: CPT | Performed by: NURSE PRACTITIONER

## 2023-06-02 PROCEDURE — U0002 COVID-19 LAB TEST NON-CDC: HCPCS | Performed by: NURSE PRACTITIONER

## 2023-06-02 PROCEDURE — 99283 EMERGENCY DEPT VISIT LOW MDM: CPT

## 2023-06-02 RX ORDER — AMOXICILLIN AND CLAVULANATE POTASSIUM 875; 125 MG/1; MG/1
1 TABLET, FILM COATED ORAL
Status: COMPLETED | OUTPATIENT
Start: 2023-06-02 | End: 2023-06-02

## 2023-06-02 RX ORDER — AMOXICILLIN 875 MG/1
875 TABLET, FILM COATED ORAL 2 TIMES DAILY
Qty: 14 TABLET | Refills: 0 | Status: SHIPPED | OUTPATIENT
Start: 2023-06-02 | End: 2023-06-09

## 2023-06-02 RX ADMIN — AMOXICILLIN AND CLAVULANATE POTASSIUM 1 TABLET: 875; 125 TABLET, FILM COATED ORAL at 07:06

## 2023-06-02 NOTE — ED PROVIDER NOTES
Encounter Date: 2023       History     Chief Complaint   Patient presents with    General Illness     Patient to ER CC of sore throat, congestion, chills which started this morning      Rachael Guerra is a 37 y.o. female with PMH of polycystic ovaries who presents to the ED for evaluation of URI symptoms.  Patient presents nasal congestion, sore throat, generalized body aches and chills.  Denies chest pain or shortness of breath.  Denies vomiting or diarrhea.  She reports no known exposure to sick contacts.    The history is provided by the patient.   Review of patient's allergies indicates:   Allergen Reactions    Percocet [oxycodone-acetaminophen] Other (See Comments)     headache     Past Medical History:   Diagnosis Date    Fever blister     PCOS (polycystic ovarian syndrome)     Polycystic ovaries     Psoriasis      Past Surgical History:   Procedure Laterality Date     SECTION  ,  and 2015      x3    DIAGNOSTIC LAPAROSCOPY N/A 2018    Procedure: LAPAROSCOPY, DIAGNOSTIC;  Surgeon: Tiera Hogan MD;  Location: [facility];  Service: OB/GYN;  Laterality: N/A;    ELBOW SURGERY      LAPAROSCOPIC SALPINGO-OOPHORECTOMY Left 2018    Procedure: SALPINGO-OOPHORECTOMY, LAPAROSCOPIC;  Surgeon: Tiera Hogan MD;  Location: [facility];  Service: OB/GYN;  Laterality: Left;    LAPAROSCOPIC SURGICAL REMOVAL OF CYST OF OVARY Right 2018    Procedure: EXCISION, CYST, OVARY Laparoscopic;  Surgeon: Tiera Hogan MD;  Location: [facility];  Service: OB/GYN;  Laterality: Right;    OVARIAN CYST REMOVAL Right 12    [place] EXPLORATORY OF ABDOMEN      TUBAL LIGATION  2015     Family History   Problem Relation Age of Onset    Hypertension Mother      Social History     Tobacco Use    Smoking status: Never    Smokeless tobacco: Never   Substance Use Topics    Alcohol use: No    Drug use: No     Review of Systems   Constitutional:  Positive for chills. Negative for fever.   HENT:  Positive  for congestion and sore throat.    Respiratory:  Negative for chest tightness and shortness of breath.    Cardiovascular:  Negative for chest pain.   Gastrointestinal:  Negative for abdominal pain and nausea.   Genitourinary:  Negative for dysuria, frequency and urgency.   Musculoskeletal:  Negative for back pain.   Skin:  Negative for rash.   Neurological:  Negative for dizziness, weakness, light-headedness and numbness.   Hematological:  Does not bruise/bleed easily.     Physical Exam     Initial Vitals [06/02/23 1829]   BP Pulse Resp Temp SpO2   (!) 165/94 (!) 115 18 99.2 °F (37.3 °C) 100 %      MAP       --         Physical Exam    Nursing note and vitals reviewed.  Constitutional: She appears well-developed and well-nourished.   HENT:   Head: Normocephalic and atraumatic.   Right Ear: Tympanic membrane, external ear and ear canal normal. Tympanic membrane is not erythematous. No middle ear effusion.   Left Ear: Tympanic membrane, external ear and ear canal normal. Tympanic membrane is not erythematous.  No middle ear effusion.   Nose: Mucosal edema present.   Mouth/Throat: Uvula is midline and mucous membranes are normal. Mucous membranes are not pale and not dry. Posterior oropharyngeal erythema present.   Eyes: Conjunctivae and EOM are normal. Pupils are equal, round, and reactive to light.   Neck: Neck supple.   Normal range of motion.  Cardiovascular:  Normal rate, regular rhythm, normal heart sounds and intact distal pulses.           Pulmonary/Chest: Effort normal and breath sounds normal. She has no decreased breath sounds. She has no wheezes. She has no rhonchi. She has no rales.   Abdominal: Abdomen is soft. Bowel sounds are normal. There is no abdominal tenderness.   Musculoskeletal:         General: Normal range of motion.      Cervical back: Normal range of motion and neck supple.     Neurological: She is alert and oriented to person, place, and time. She has normal strength. She displays normal  reflexes. No cranial nerve deficit or sensory deficit.   Skin: Skin is warm and dry. Capillary refill takes less than 2 seconds. No rash noted.   Psychiatric: She has a normal mood and affect. Her behavior is normal. Judgment and thought content normal.       ED Course   Procedures  Labs Reviewed   GROUP A STREP, MOLECULAR - Abnormal; Notable for the following components:       Result Value    Group A Strep, Molecular Positive (*)     All other components within normal limits   INFLUENZA A & B BY MOLECULAR   SARS-COV-2 RNA AMPLIFICATION, QUAL          Imaging Results    None          Medications   amoxicillin-clavulanate 875-125mg per tablet 1 tablet (1 tablet Oral Given 6/2/23 1912)     Medical Decision Making:   Differential Diagnosis:   Strep, COVID, influenza, viral URI  Clinical Tests:   Lab Tests: Ordered and Reviewed  ED Management:  Evaluation of a 37-year-old female with URI symptoms.  + strep   Patient discharged home with amoxicillin. Patient/caregiver voices understanding and feels comfortable with discharge plan.      The patient acknowledges that close follow up with medical provider is required. Instructed to follow up with PCP within 2 days. Patient was given specific return precautions. The patient agrees to comply with all instruction and directions given in the ER.                          Clinical Impression:   Final diagnoses:  [J02.0] Strep pharyngitis (Primary)        ED Disposition Condition    Discharge Stable          ED Prescriptions       Medication Sig Dispense Start Date End Date Auth. Provider    amoxicillin (AMOXIL) 875 MG tablet Take 1 tablet (875 mg total) by mouth 2 (two) times daily. for 7 days 14 tablet 6/2/2023 6/9/2023 Jas Bahena MD          Follow-up Information       Follow up With Specialties Details Why Contact Info    Summer Jaime MD Internal Medicine Schedule an appointment as soon as possible for a visit in 2 days  [address]  [place]  [address]  [phone]               Kelley Oliveros, ANNEL  06/02/23 1913

## 2023-06-02 NOTE — Clinical Note
"Rachael Umana" Mari was seen and treated in our emergency department on 6/2/2023.  She may return to work on 06/06/2023.       If you have any questions or concerns, please don't hesitate to call.       RN    "

## 2023-06-04 ENCOUNTER — TELEPHONE (OUTPATIENT)
Dept: EMERGENCY MEDICINE | Facility: HOSPITAL | Age: 38
End: 2023-06-04
Payer: MEDICAID

## 2023-06-04 RX ORDER — CLINDAMYCIN HYDROCHLORIDE 300 MG/1
300 CAPSULE ORAL 4 TIMES DAILY
Qty: 28 CAPSULE | Refills: 0 | Status: SHIPPED | OUTPATIENT
Start: 2023-06-04 | End: 2023-06-11

## 2023-11-11 ENCOUNTER — OFFICE VISIT (OUTPATIENT)
Dept: URGENT CARE | Facility: CLINIC | Age: 38
End: 2023-11-11
Payer: MEDICAID